# Patient Record
Sex: FEMALE | Race: WHITE | NOT HISPANIC OR LATINO | URBAN - METROPOLITAN AREA
[De-identification: names, ages, dates, MRNs, and addresses within clinical notes are randomized per-mention and may not be internally consistent; named-entity substitution may affect disease eponyms.]

---

## 2018-08-08 ENCOUNTER — OFFICE VISIT (OUTPATIENT)
Dept: URBAN - METROPOLITAN AREA CLINIC 45 | Facility: CLINIC | Age: 83
End: 2018-08-08
Payer: MEDICARE

## 2018-08-08 DIAGNOSIS — H04.123 DRY EYE SYNDROME OF BILATERAL LACRIMAL GLANDS: ICD-10-CM

## 2018-08-08 PROCEDURE — 99213 OFFICE O/P EST LOW 20 MIN: CPT | Performed by: OPTOMETRIST

## 2018-08-08 RX ORDER — TRAVOPROST 0.04 MG/ML
0.004 % SOLUTION/ DROPS OPHTHALMIC
Qty: 1 | Refills: 6 | Status: INACTIVE
Start: 2018-08-08 | End: 2018-09-06

## 2018-08-08 ASSESSMENT — KERATOMETRY
OS: 44.75
OD: 44.25

## 2018-08-08 ASSESSMENT — INTRAOCULAR PRESSURE
OD: 14
OS: 16

## 2018-08-08 NOTE — IMPRESSION/PLAN
Impression: Primary open-angle glaucoma, right eye, moderate stage: H40.1112. Plan: Pt getting irritation with the Latanoprost, pt to d/c. Pt to start TravatanZ 1 gtt qhs ou. Educated patient on risks of non-compliance, side effects, benefits and anticipated results, pt understands. Continue with Alphagan bid ou and Timolol bid ou.

## 2018-08-29 ENCOUNTER — OFFICE VISIT (OUTPATIENT)
Dept: URBAN - METROPOLITAN AREA CLINIC 45 | Facility: CLINIC | Age: 83
End: 2018-08-29
Payer: MEDICARE

## 2018-08-29 DIAGNOSIS — H40.1112 PRIMARY OPEN-ANGLE GLAUCOMA, RIGHT EYE, MODERATE STAGE: ICD-10-CM

## 2018-08-29 PROCEDURE — 99213 OFFICE O/P EST LOW 20 MIN: CPT | Performed by: OPTOMETRIST

## 2018-08-29 ASSESSMENT — INTRAOCULAR PRESSURE
OS: 15
OD: 16

## 2018-08-29 NOTE — IMPRESSION/PLAN
Impression: Primary open-angle glaucoma, left eye, mild stage: H40.1121. Plan: Travatan z q hs OU, timolol bid ou, alphagan bid ou. Pt having dryness and irritation from all the glc gtt.  Refer for SLT consult

## 2018-10-04 ENCOUNTER — OFFICE VISIT (OUTPATIENT)
Dept: URBAN - METROPOLITAN AREA CLINIC 45 | Facility: CLINIC | Age: 83
End: 2018-10-04
Payer: MEDICARE

## 2018-10-04 PROCEDURE — 99214 OFFICE O/P EST MOD 30 MIN: CPT | Performed by: OPHTHALMOLOGY

## 2018-10-04 PROCEDURE — 92020 GONIOSCOPY: CPT | Performed by: OPHTHALMOLOGY

## 2018-10-04 PROCEDURE — 92133 CPTRZD OPH DX IMG PST SGM ON: CPT | Performed by: OPHTHALMOLOGY

## 2018-10-04 ASSESSMENT — INTRAOCULAR PRESSURE
OD: 13
OS: 14

## 2018-10-04 NOTE — IMPRESSION/PLAN
Impression: Primary open-angle glaucoma, right eye, moderate stage: K46.9778.
10/18 OCT 70/77 9/8 Plan: Intraocular pressure well controlled, side effects from medication(s) account for the patient's complaints. Will continue Timolol BID OU, Alphagan BID OU. Pt feels irritation was less with Latanoprost. Will d/c Travatan, and restart Latanoprost QHS OU. If symptoms do not decrease or IOP elevates will proceed with SLT. R/B/A discussed with patient. Pt voiced understanding. Will change medications. New prescription sent to pharmacy today. IOP check with Dr Will Joel in 3 months.

## 2018-10-04 NOTE — IMPRESSION/PLAN
Impression: Blepharitis of right eyelid: H01.003. Plan: Blepharitis accounts for the patient's complaints. The condition appears chronic and eyelid scrubs with ointment have been suggested. The patient understands this may not cure the condition and that there may be the need to be on a maintenance program. Rec warm compresses.

## 2018-12-05 ENCOUNTER — OFFICE VISIT (OUTPATIENT)
Dept: URBAN - METROPOLITAN AREA CLINIC 45 | Facility: CLINIC | Age: 83
End: 2018-12-05
Payer: MEDICARE

## 2018-12-05 PROCEDURE — 92012 INTRM OPH EXAM EST PATIENT: CPT | Performed by: OPTOMETRIST

## 2018-12-05 RX ORDER — LATANOPROST 50 UG/ML
0.005 % SOLUTION OPHTHALMIC
Qty: 1 | Refills: 6 | Status: INACTIVE
Start: 2018-12-05 | End: 2019-03-14

## 2018-12-05 RX ORDER — BRIMONIDINE TARTRATE 1.5 MG/ML
0.15 % SOLUTION/ DROPS OPHTHALMIC
Qty: 1 | Refills: 6 | Status: INACTIVE
Start: 2018-12-05 | End: 2019-01-30

## 2018-12-05 RX ORDER — TIMOLOL MALEATE 5 MG/ML
0.5 % SOLUTION/ DROPS OPHTHALMIC
Qty: 1 | Refills: 7 | Status: INACTIVE
Start: 2018-12-05 | End: 2019-07-10

## 2018-12-05 ASSESSMENT — INTRAOCULAR PRESSURE
OD: 14
OS: 14

## 2018-12-05 NOTE — IMPRESSION/PLAN
Impression: Primary open-angle glaucoma, left eye, mild stage: H40.1121.  Plan: ALPHAGAN BID OU, TIMOLOL BID OU, LATANOPROST Q HS OU

## 2018-12-05 NOTE — IMPRESSION/PLAN
Impression: Blepharitis of left eyelid: H01.006. Plan: Lid scrubs and hygiene were explained. Patient instructed to apply warm compresses. Patient instructed to use artificial tears as needed.

## 2019-03-06 ENCOUNTER — OFFICE VISIT (OUTPATIENT)
Dept: URBAN - METROPOLITAN AREA CLINIC 45 | Facility: CLINIC | Age: 84
End: 2019-03-06
Payer: MEDICARE

## 2019-03-06 PROCEDURE — 92014 COMPRE OPH EXAM EST PT 1/>: CPT | Performed by: OPTOMETRIST

## 2019-03-06 PROCEDURE — 92250 FUNDUS PHOTOGRAPHY W/I&R: CPT | Performed by: OPTOMETRIST

## 2019-03-06 ASSESSMENT — INTRAOCULAR PRESSURE
OD: 14
OS: 14

## 2019-03-06 NOTE — IMPRESSION/PLAN
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  Plan: Daniel Delaney

## 2019-03-06 NOTE — IMPRESSION/PLAN
Impression: Primary open-angle glaucoma, bilateral, moderate stage: H20.0926.  Plan: ALPHAGAN BID OU, TIMOLOL BID OU, LATANOPROST Q HS OU

## 2019-07-10 ENCOUNTER — OFFICE VISIT (OUTPATIENT)
Dept: URBAN - METROPOLITAN AREA CLINIC 45 | Facility: CLINIC | Age: 84
End: 2019-07-10
Payer: MEDICARE

## 2019-07-10 DIAGNOSIS — H02.055 TRICHIASIS WITHOUT ENTROPION LEFT LOWER EYELID: ICD-10-CM

## 2019-07-10 DIAGNOSIS — H02.052 TRICHIASIS WITHOUT ENTROPIAN RIGHT LOWER EYELID: ICD-10-CM

## 2019-07-10 DIAGNOSIS — H01.8 OTHER SPECIFIED INFLAMMATION OF EYELID: ICD-10-CM

## 2019-07-10 PROCEDURE — 99213 OFFICE O/P EST LOW 20 MIN: CPT | Performed by: OPTOMETRIST

## 2019-07-10 PROCEDURE — 67820 REVISE EYELASHES: CPT | Performed by: OPTOMETRIST

## 2019-07-10 RX ORDER — NETARSUDIL AND LATANOPROST OPHTHALMIC SOLUTION, 0.02%/0.005% .2; .05 MG/ML; MG/ML
SOLUTION/ DROPS OPHTHALMIC; TOPICAL
Qty: 1 | Refills: 6 | Status: INACTIVE
Start: 2019-07-10 | End: 2019-08-01

## 2019-07-10 ASSESSMENT — INTRAOCULAR PRESSURE
OD: 14
OS: 15

## 2019-07-10 NOTE — IMPRESSION/PLAN
Impression: Primary open-angle glaucoma, bilateral, moderate stage: H21.0703. Plan: pt overwhelmed with 3 glc meds, dc all 3 and switch to Rocklatan. Fu in 3 weeks.  If not covered can do Combigan and Latanoprost.

## 2019-08-01 ENCOUNTER — OFFICE VISIT (OUTPATIENT)
Dept: URBAN - METROPOLITAN AREA CLINIC 45 | Facility: CLINIC | Age: 84
End: 2019-08-01
Payer: MEDICARE

## 2019-08-01 PROCEDURE — 99213 OFFICE O/P EST LOW 20 MIN: CPT | Performed by: OPTOMETRIST

## 2019-08-01 RX ORDER — BRIMONIDINE TARTRATE, TIMOLOL MALEATE 2; 5 MG/ML; MG/ML
SOLUTION/ DROPS OPHTHALMIC
Qty: 15 | Refills: 6 | Status: INACTIVE
Start: 2019-08-01 | End: 2019-08-20

## 2019-08-01 RX ORDER — LATANOPROST 50 UG/ML
0.005 % SOLUTION OPHTHALMIC
Qty: 7.5 | Refills: 6 | Status: INACTIVE
Start: 2019-08-01 | End: 2019-08-26

## 2019-08-01 ASSESSMENT — INTRAOCULAR PRESSURE
OS: 14
OD: 16

## 2019-08-01 NOTE — IMPRESSION/PLAN
Impression: Primary open-angle glaucoma, bilateral, moderate stage: O72.8577.  Plan: PT EXPERIENCING LIGHT-HEADEDNESS WITH ROCKLATAN, SWITCH TO COMBIGAN AND LATANOPROST

## 2019-08-22 ENCOUNTER — OFFICE VISIT (OUTPATIENT)
Dept: URBAN - METROPOLITAN AREA CLINIC 45 | Facility: CLINIC | Age: 84
End: 2019-08-22
Payer: MEDICARE

## 2019-08-22 PROCEDURE — 99213 OFFICE O/P EST LOW 20 MIN: CPT | Performed by: OPTOMETRIST

## 2019-08-22 RX ORDER — NEOMYCIN SULFATE, POLYMYXIN B SULFATE AND DEXAMETHASONE 3.5; 10000; 1 MG/G; [USP'U]/G; MG/G
OINTMENT OPHTHALMIC
Qty: 1 | Refills: 0 | Status: INACTIVE
Start: 2019-08-22 | End: 2019-08-26

## 2019-08-22 ASSESSMENT — INTRAOCULAR PRESSURE
OD: 20
OS: 16

## 2019-08-22 NOTE — IMPRESSION/PLAN
Impression: Other specified inflammation of eyelid: H01.8. Plan: maxitrol sagrario qid oS Educated patient on risks of non-compliance, side effects, benefits and anticipated results, pt understands.

## 2019-08-22 NOTE — IMPRESSION/PLAN
Impression: Primary open-angle glaucoma, bilateral, moderate stage: W70.1781. Plan: IOP is high today, pt did not take combigan due to it causing eye pain.  pt to continue with Latanoprost qhs ou and start [employer] 1 gtt bid ou

## 2019-08-26 ENCOUNTER — OFFICE VISIT (OUTPATIENT)
Dept: URBAN - METROPOLITAN AREA CLINIC 45 | Facility: CLINIC | Age: 84
End: 2019-08-26
Payer: MEDICARE

## 2019-08-26 PROCEDURE — 99213 OFFICE O/P EST LOW 20 MIN: CPT | Performed by: OPTOMETRIST

## 2019-08-26 RX ORDER — TIMOLOL MALEATE 5 MG/ML
0.5 % SOLUTION/ DROPS OPHTHALMIC
Qty: 1 | Refills: 7 | Status: INACTIVE
Start: 2019-08-26 | End: 2019-12-11

## 2019-08-26 RX ORDER — LATANOPROST 50 UG/ML
0.005 % SOLUTION OPHTHALMIC
Qty: 7.5 | Refills: 6 | Status: INACTIVE
Start: 2019-08-26 | End: 2019-12-11

## 2019-08-26 RX ORDER — BRIMONIDINE TARTRATE 1.5 MG/ML
0.15 % SOLUTION/ DROPS OPHTHALMIC
Qty: 1 | Refills: 6 | Status: INACTIVE
Start: 2019-08-26 | End: 2019-12-11

## 2019-08-26 ASSESSMENT — INTRAOCULAR PRESSURE
OS: 14
OD: 16

## 2019-08-26 NOTE — IMPRESSION/PLAN
Impression: Primary open-angle glaucoma, bilateral, moderate stage: U84.9708. Plan: pt to stop Simbrinza, pt is having irritation.  Pt to continue with Latanoprost qhs ou and Alphagan bid ou and timolol bid ou

## 2019-08-26 NOTE — IMPRESSION/PLAN
Impression: Other specified inflammation of eyelid: H01.8. Plan: pt to d/c maxitrol sagrario, due to side effects.  warm compresses bid ou

## 2019-12-11 ENCOUNTER — OFFICE VISIT (OUTPATIENT)
Dept: URBAN - METROPOLITAN AREA CLINIC 45 | Facility: CLINIC | Age: 84
End: 2019-12-11
Payer: MEDICARE

## 2019-12-11 PROCEDURE — 99213 OFFICE O/P EST LOW 20 MIN: CPT | Performed by: OPTOMETRIST

## 2019-12-11 RX ORDER — LATANOPROST 50 UG/ML
0.005 % SOLUTION OPHTHALMIC
Qty: 7.5 | Refills: 6 | Status: INACTIVE
Start: 2019-12-11 | End: 2020-04-22

## 2019-12-11 RX ORDER — LOTEPREDNOL ETABONATE 5 MG/G
0.5 % OINTMENT OPHTHALMIC
Qty: 1 | Refills: 0 | Status: INACTIVE
Start: 2019-12-11 | End: 2020-04-22

## 2019-12-11 RX ORDER — TIMOLOL MALEATE 5 MG/ML
0.5 % SOLUTION/ DROPS OPHTHALMIC
Qty: 1 | Refills: 7 | Status: INACTIVE
Start: 2019-12-11 | End: 2020-04-22

## 2019-12-11 RX ORDER — BRIMONIDINE TARTRATE 1.5 MG/ML
0.15 % SOLUTION/ DROPS OPHTHALMIC
Qty: 1 | Refills: 6 | Status: INACTIVE
Start: 2019-12-11 | End: 2020-04-17

## 2019-12-11 ASSESSMENT — INTRAOCULAR PRESSURE
OS: 15
OD: 15

## 2019-12-11 NOTE — IMPRESSION/PLAN
Impression: Primary open-angle glaucoma, bilateral, moderate stage: W56.7684.  Plan: Continue Timolol BID OU and Latanoprost QHS OU, alphagan bid ou

## 2019-12-11 NOTE — IMPRESSION/PLAN
Impression: Other specified inflammation of eyelid: H01.8.  Plan: Prescribed Lotemax sagrario apply 1/4 inch ribbon in the outer canthus os

## 2020-04-22 ENCOUNTER — OFFICE VISIT (OUTPATIENT)
Dept: URBAN - METROPOLITAN AREA CLINIC 45 | Facility: CLINIC | Age: 85
End: 2020-04-22
Payer: MEDICARE

## 2020-04-22 DIAGNOSIS — H40.1132 PRIMARY OPEN-ANGLE GLAUCOMA, BILATERAL, MODERATE STAGE: ICD-10-CM

## 2020-04-22 PROCEDURE — 92083 EXTENDED VISUAL FIELD XM: CPT | Performed by: OPTOMETRIST

## 2020-04-22 PROCEDURE — 92014 COMPRE OPH EXAM EST PT 1/>: CPT | Performed by: OPTOMETRIST

## 2020-04-22 PROCEDURE — 92134 CPTRZ OPH DX IMG PST SGM RTA: CPT | Performed by: OPTOMETRIST

## 2020-04-22 RX ORDER — LATANOPROST 50 UG/ML
0.005 % SOLUTION OPHTHALMIC
Qty: 7.5 | Refills: 6 | Status: INACTIVE
Start: 2020-04-22 | End: 2021-07-06

## 2020-04-22 RX ORDER — TIMOLOL MALEATE 5 MG/ML
0.5 % SOLUTION/ DROPS OPHTHALMIC
Qty: 1 | Refills: 7 | Status: INACTIVE
Start: 2020-04-22 | End: 2022-04-05

## 2020-04-22 RX ORDER — BRIMONIDINE TARTRATE 2 MG/ML
0.2 % SOLUTION/ DROPS OPHTHALMIC
Qty: 15 | Refills: 6 | Status: INACTIVE
Start: 2020-04-22 | End: 2020-06-05

## 2020-04-22 ASSESSMENT — INTRAOCULAR PRESSURE
OD: 14
OS: 15

## 2020-04-22 NOTE — IMPRESSION/PLAN
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  Plan: Chase Felix

## 2020-04-22 NOTE — IMPRESSION/PLAN
Impression: Primary open-angle glaucoma, bilateral, moderate stage: X42.9537.  Plan: brimonidine bid ou, timolol bid ou, latanoprost q hs ou

## 2020-06-05 ENCOUNTER — OFFICE VISIT (OUTPATIENT)
Dept: URBAN - METROPOLITAN AREA CLINIC 45 | Facility: CLINIC | Age: 85
End: 2020-06-05
Payer: MEDICARE

## 2020-06-05 PROCEDURE — 92012 INTRM OPH EXAM EST PATIENT: CPT | Performed by: OPTOMETRIST

## 2020-06-05 RX ORDER — LISINOPRIL 20 MG/1
20 MG TABLET ORAL
Qty: 0 | Refills: 0 | Status: ACTIVE
Start: 2020-06-05

## 2020-06-05 RX ORDER — NEOMYCIN SULFATE, POLYMYXIN B SULFATE AND DEXAMETHASONE 3.5; 10000; 1 MG/G; [USP'U]/G; MG/G
OINTMENT OPHTHALMIC
Qty: 1 | Refills: 0 | Status: INACTIVE
Start: 2020-06-05 | End: 2020-06-09

## 2020-06-05 ASSESSMENT — INTRAOCULAR PRESSURE
OD: 12
OS: 15

## 2020-06-05 NOTE — IMPRESSION/PLAN
Impression: Primary open-angle glaucoma, right eye, moderate stage: H40.1112. Plan: pt is on max meds and having chronic blepharitis , discussed surgical options, consult with Dr. Analy Hou. Continue with Latanoprost q hs ou, Timolol bid ou. Dc Brimonidine for now.

## 2020-06-05 NOTE — IMPRESSION/PLAN
Impression: Other specified inflammation of eyelid: H01.8.  Plan: LOTEMAX sagrario apply 1/8 inch ribbon prn to [facility]

## 2020-06-11 ENCOUNTER — OFFICE VISIT (OUTPATIENT)
Dept: URBAN - METROPOLITAN AREA CLINIC 45 | Facility: CLINIC | Age: 85
End: 2020-06-11
Payer: MEDICARE

## 2020-06-11 PROCEDURE — 99213 OFFICE O/P EST LOW 20 MIN: CPT | Performed by: OPHTHALMOLOGY

## 2020-06-11 ASSESSMENT — INTRAOCULAR PRESSURE
OS: 17
OD: 14

## 2020-06-11 NOTE — IMPRESSION/PLAN
Impression: Primary open-angle glaucoma, right eye, moderate stage: D93.6411. 508/503, 4/20 OCT 70/73 9/7 Plan: Discussed diagnosis with patient. Patient having chronic blepharitis likely due to glaucoma medication. Recommend patient continue with Latanoprost QHS OU and Timolol BID OU. Recommend pt undergo SLT OS then OD to help lower IOP further. Goal to d/c Latanoprost after SLT. R/B discussed, pt understands and wishes to proceed.

## 2020-07-09 ENCOUNTER — OFFICE VISIT (OUTPATIENT)
Dept: URBAN - METROPOLITAN AREA CLINIC 45 | Facility: CLINIC | Age: 85
End: 2020-07-09
Payer: MEDICARE

## 2020-07-09 DIAGNOSIS — H01.006 BLEPHARITIS OF LEFT EYELID: ICD-10-CM

## 2020-07-09 DIAGNOSIS — H01.003 BLEPHARITIS OF RIGHT EYELID: Primary | ICD-10-CM

## 2020-07-09 PROCEDURE — 92012 INTRM OPH EXAM EST PATIENT: CPT | Performed by: OPTOMETRIST

## 2020-07-09 RX ORDER — BRIMONIDINE TARTRATE 1.5 MG/ML
0.15 % SOLUTION/ DROPS OPHTHALMIC
Qty: 1 | Refills: 6 | Status: INACTIVE
Start: 2020-07-09 | End: 2021-01-04

## 2020-07-09 ASSESSMENT — INTRAOCULAR PRESSURE
OD: 18
OS: 18

## 2020-07-09 NOTE — IMPRESSION/PLAN
Impression: Primary open-angle glaucoma, bilateral, moderate stage: M93.5142. Plan: continue Timolol bid ou, Latanoprost q hs ou. Add Alphagan P 0.15% bid (pt tried and failed Brimonidine).  Keep appt with Dr. Ann Rogers

## 2020-08-11 ENCOUNTER — PROCEDURE (OUTPATIENT)
Dept: URBAN - METROPOLITAN AREA CLINIC 45 | Facility: CLINIC | Age: 85
End: 2020-08-11
Payer: MEDICARE

## 2020-08-11 PROCEDURE — 65855 TRABECULOPLASTY LASER SURG: CPT | Performed by: OPHTHALMOLOGY

## 2020-08-19 ENCOUNTER — POST-OPERATIVE VISIT (OUTPATIENT)
Dept: URBAN - METROPOLITAN AREA CLINIC 45 | Facility: CLINIC | Age: 85
End: 2020-08-19

## 2020-08-19 DIAGNOSIS — Z48.810 ENCNTR FOR SURGICAL AFTCR FOL SURGERY ON THE SENSE ORGANS: ICD-10-CM

## 2020-08-19 DIAGNOSIS — Z09 ENCNTR FOR F/U EXAM AFT TRTMT FOR COND OTH THAN MALIG NEOPLM: Primary | ICD-10-CM

## 2020-08-19 PROCEDURE — 99024 POSTOP FOLLOW-UP VISIT: CPT | Performed by: OPTOMETRIST

## 2020-08-19 ASSESSMENT — INTRAOCULAR PRESSURE
OD: 10
OS: 10

## 2021-02-15 ENCOUNTER — OFFICE VISIT (OUTPATIENT)
Dept: URBAN - METROPOLITAN AREA CLINIC 45 | Facility: CLINIC | Age: 86
End: 2021-02-15
Payer: MEDICARE

## 2021-02-15 PROCEDURE — 99214 OFFICE O/P EST MOD 30 MIN: CPT | Performed by: OPTOMETRIST

## 2021-02-15 PROCEDURE — 92134 CPTRZ OPH DX IMG PST SGM RTA: CPT | Performed by: OPTOMETRIST

## 2021-02-15 ASSESSMENT — INTRAOCULAR PRESSURE
OD: 16
OS: 14

## 2021-02-15 NOTE — IMPRESSION/PLAN
Impression: Exudative age-related macular degeneration of right eye: H35.0530.  Plan: return for retina consult

oct is irregular ou, no SRF on oct but heme on exam

## 2021-02-17 ENCOUNTER — OFFICE VISIT (OUTPATIENT)
Dept: URBAN - METROPOLITAN AREA CLINIC 45 | Facility: CLINIC | Age: 86
End: 2021-02-17
Payer: MEDICARE

## 2021-02-17 PROCEDURE — 92134 CPTRZ OPH DX IMG PST SGM RTA: CPT | Performed by: OPHTHALMOLOGY

## 2021-02-17 PROCEDURE — 67028 INJECTION EYE DRUG: CPT | Performed by: OPHTHALMOLOGY

## 2021-02-17 PROCEDURE — 92235 FLUORESCEIN ANGRPH MLTIFRAME: CPT | Performed by: OPHTHALMOLOGY

## 2021-02-17 PROCEDURE — 92014 COMPRE OPH EXAM EST PT 1/>: CPT | Performed by: OPHTHALMOLOGY

## 2021-02-17 ASSESSMENT — INTRAOCULAR PRESSURE
OS: 21
OD: 17

## 2021-02-17 NOTE — IMPRESSION/PLAN
Impression: Nonexudative age-related macular degeneration of lt eye, intermediate dry stage: H35.3122. Plan:   AREDS2/jude   Recheck 6m OCT/exam

 *25 mod for exam of fellow eye dry AMD OS apart from injection OD

## 2021-02-17 NOTE — IMPRESSION/PLAN
Impression: Exudative age-related macular degeneration of right eye: H35.1110. Plan:  JOHNSON OD today  4w OCT/JOHNSON OD

## 2021-02-24 ENCOUNTER — OFFICE VISIT (OUTPATIENT)
Dept: URBAN - METROPOLITAN AREA CLINIC 45 | Facility: CLINIC | Age: 86
End: 2021-02-24
Payer: MEDICARE

## 2021-02-24 DIAGNOSIS — H35.3210 EXUDATIVE AGE-RELATED MACULAR DEGENERATION OF RIGHT EYE: ICD-10-CM

## 2021-02-24 DIAGNOSIS — H40.1121 PRIMARY OPEN-ANGLE GLAUCOMA, LEFT EYE, MILD STAGE: ICD-10-CM

## 2021-02-24 PROCEDURE — 92133 CPTRZD OPH DX IMG PST SGM ON: CPT | Performed by: OPTOMETRIST

## 2021-02-24 PROCEDURE — 99213 OFFICE O/P EST LOW 20 MIN: CPT | Performed by: OPTOMETRIST

## 2021-02-24 ASSESSMENT — INTRAOCULAR PRESSURE
OS: 13
OD: 11

## 2021-02-24 NOTE — IMPRESSION/PLAN
Impression: Exudative age-related macular degeneration of right eye: H35.5300.  Plan: Keep appointment with Retinal specialist.

## 2021-02-24 NOTE — IMPRESSION/PLAN
Impression: Primary open-angle glaucoma, right eye, moderate stage: H40.1112. Plan: OCT show Moderate thinning OD, Normal OS, Stable, Reliable. IOP is better today OU. Continue with Alphagan and Timolol BID OU, Latanoprost QHS OU. Rtc for iop check and vf 24-2.

## 2021-02-24 NOTE — IMPRESSION/PLAN
Impression: Nonexudative age-related macular degeneration of lt eye, intermediate dry stage: H35.3122. Plan: VIJAY WELLER.

## 2021-03-18 ENCOUNTER — OFFICE VISIT (OUTPATIENT)
Dept: URBAN - METROPOLITAN AREA CLINIC 54 | Facility: CLINIC | Age: 86
End: 2021-03-18
Payer: MEDICARE

## 2021-03-18 PROCEDURE — 99204 OFFICE O/P NEW MOD 45 MIN: CPT | Performed by: OPHTHALMOLOGY

## 2021-03-18 PROCEDURE — 92134 CPTRZ OPH DX IMG PST SGM RTA: CPT | Performed by: OPHTHALMOLOGY

## 2021-03-18 ASSESSMENT — INTRAOCULAR PRESSURE
OD: 16
OS: 16

## 2021-03-18 NOTE — IMPRESSION/PLAN
Impression: Exdtve age-rel mclr degn, right eye, with actv chrdl neovas: H35.3211. Right.
s/p Avastin injection x 2/17/21 OCT:
OD: atrophy, drusen OS: drusen Plan: The diagnosis, natural history, and prognosis of wet AMD, as well as the risks and benefits of various treatment options including laser, PDT, Avastin, Lucentis and Eylea; along with the alternatives of observation or participation in a clinical trial, were discussed at length. The patient understands that treatment may not improve vision, but should reduce the risk of further visual loss. The patient understands that smoking is the most significant modifiable risk factor for the development of advanced AMD, and also understands that if they do smoke (or have history of smoking in the past 5 years), they cannot take vitamin A/beta-carotene, since it may increase their risk for lung cancer. Given stability of vision and exam, pt elects to proceed with observation with PRN tx. 

RTC 1 month DFE OU OCT OU Re-eval Avastin

## 2021-03-18 NOTE — IMPRESSION/PLAN
Impression: Nexdtve age-related mclr degn, left eye, intermed dry stage: H35.3122. Plan: Examination and OCT confirm the presence of RPE changes and drusen consistent with dry macular degeneration. The diagnosis, natural history, and prognosis of dry AMD as well as the current lack of treatment were discussed at length. The patient was instructed to begin taking AREDS 2 protocol anti-oxidant vitamins. The use of an Amsler grid and the importance of weekly self-monitoring were reviewed. The patient understands that smoking is the most significant modifiable risk factor for the development of advanced AMD, and also understands that if they do smoke (or have history of smoking in the past 5 years), they cannot take vitamin A/beta-carotene, since it may increase their risk for lung cancer. The patient was instructed to call our office immediately upon any decreased vision or increased symptoms.

## 2021-03-18 NOTE — IMPRESSION/PLAN
Impression: Vitreous degeneration, bilateral: H43.813. Plan: Indirect ophthalmoscopy with scleral depression was performed and no retinal breaks or evidence of detachment were identified. The diagnosis, natural history, and prognosis of PVD were discussed at length. The signs and symptoms of retinal break/detachment including increased flashes, new-onset floaters, and development of a shadow/curtain shade in the visual field were reviewed.

## 2021-04-15 ENCOUNTER — OFFICE VISIT (OUTPATIENT)
Dept: URBAN - METROPOLITAN AREA CLINIC 54 | Facility: CLINIC | Age: 86
End: 2021-04-15
Payer: MEDICARE

## 2021-04-15 DIAGNOSIS — H35.3211 EXDTVE AGE-REL MCLR DEGN, RIGHT EYE, WITH ACTV CHRDL NEOVAS: Primary | ICD-10-CM

## 2021-04-15 PROCEDURE — 92134 CPTRZ OPH DX IMG PST SGM RTA: CPT | Performed by: OPHTHALMOLOGY

## 2021-04-15 PROCEDURE — 99214 OFFICE O/P EST MOD 30 MIN: CPT | Performed by: OPHTHALMOLOGY

## 2021-04-15 ASSESSMENT — INTRAOCULAR PRESSURE
OS: 14
OD: 14

## 2021-05-27 ENCOUNTER — OFFICE VISIT (OUTPATIENT)
Dept: URBAN - METROPOLITAN AREA CLINIC 54 | Facility: CLINIC | Age: 86
End: 2021-05-27
Payer: MEDICARE

## 2021-05-27 DIAGNOSIS — H40.9 GLAUCOMA: ICD-10-CM

## 2021-05-27 DIAGNOSIS — H43.813 VITREOUS DEGENERATION, BILATERAL: ICD-10-CM

## 2021-05-27 DIAGNOSIS — H35.3122 NEXDTVE AGE-RELATED MCLR DEGN, LEFT EYE, INTERMED DRY STAGE: ICD-10-CM

## 2021-05-27 PROCEDURE — 92134 CPTRZ OPH DX IMG PST SGM RTA: CPT | Performed by: OPHTHALMOLOGY

## 2021-05-27 PROCEDURE — 99214 OFFICE O/P EST MOD 30 MIN: CPT | Performed by: OPHTHALMOLOGY

## 2021-05-27 ASSESSMENT — INTRAOCULAR PRESSURE
OS: 14
OD: 13

## 2021-05-27 NOTE — IMPRESSION/PLAN
Impression: Exdtve age-rel mclr degn, right eye, with actv chrdl neovas: H35.3211. Right.
s/p Avastin injection x 2/17/21 OCT:
OD: atrophy, drusen OS: drusen Plan: The diagnosis, natural history, and prognosis of wet AMD, as well as the risks and benefits of various treatment options including laser, PDT, Avastin, Lucentis and Eylea; along with the alternatives of observation or participation in a clinical trial, were discussed at length. The patient understands that treatment may not improve vision, but should reduce the risk of further visual loss. The patient understands that smoking is the most significant modifiable risk factor for the development of advanced AMD, and also understands that if they do smoke (or have history of smoking in the past 5 years), they cannot take vitamin A/beta-carotene, since it may increase their risk for lung cancer. Given stability of vision and exam, pt elects to proceed with observation with PRN tx. 

RTC 3 month DFE OU OCT OU Re-eval Avastin

## 2021-07-02 ENCOUNTER — OFFICE VISIT (OUTPATIENT)
Dept: URBAN - METROPOLITAN AREA CLINIC 45 | Facility: CLINIC | Age: 86
End: 2021-07-02
Payer: MEDICARE

## 2021-07-02 DIAGNOSIS — H35.3131 NONEXUDATIVE AGE-RELATED MACULAR DEGENERATION, BILATERAL, EARLY DRY STAGE: ICD-10-CM

## 2021-07-02 PROCEDURE — 99213 OFFICE O/P EST LOW 20 MIN: CPT | Performed by: OPTOMETRIST

## 2021-07-02 PROCEDURE — 92083 EXTENDED VISUAL FIELD XM: CPT | Performed by: OPTOMETRIST

## 2021-07-02 RX ORDER — LOTEPREDNOL ETABONATE 5 MG/G
0.5 % OINTMENT OPHTHALMIC
Qty: 1 | Refills: 0 | Status: ACTIVE
Start: 2021-07-02

## 2021-07-02 ASSESSMENT — INTRAOCULAR PRESSURE
OS: 16
OD: 17
OD: 13
OS: 14

## 2021-07-02 ASSESSMENT — KERATOMETRY
OD: 44.38
OS: 44.75

## 2021-07-02 NOTE — IMPRESSION/PLAN
Impression: Exudative age-related macular degeneration of right eye: H35.8260. Plan: S/P Anti-VEGF OD. Patient is moving to [place], recommend patient see ophthalmology soon after she is settled.

## 2021-07-02 NOTE — IMPRESSION/PLAN
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  Plan: See plan 1

## 2021-07-02 NOTE — IMPRESSION/PLAN
Impression: Primary open-angle glaucoma, right eye, moderate stage: H40.1112. Plan: IOP well-controlled with gtts. Continue Timolol BID OU, Alphagan BID OU, Latan QHS. VF today shows no glaucomatous defects.

## 2022-04-27 NOTE — IMPRESSION/PLAN
Impression: Primary open-angle glaucoma, right eye, moderate stage: H40.1112. Plan: pt on max med therapy, experiencing side effects.  Disscussed SLT, see Dr. Yoli Santos Per Dr. Tena, there was no drainage this morning.  Patient may see April or her tomorrow.    April said it is okay to schedule patient to see her 04/28/22, and I called the patient and scheduled.

## 2022-06-18 NOTE — IMPRESSION/PLAN
"Encounter Date: 6/17/2022    ED Physician Progress Notes        Physician Note:   ED Physician Hand-off Note:    ED Course: I assumed care of patient from off-going ED primary team. Briefly, "Abdoul Villalta is a 27 y.o. female with medical history of hidradenitis, crohn's disease on infliximab infusions, pyoderma gangrenosum, fibromyalgia, obesity presenting to the ED with the chief complaint of abdominal pain. "CBC with no leukocytosis or anemia.  CMP without significant electrolyte abnormalities.  No ASHELY. Normal LFTs.  CRP and ESR within normal limits.  Lipase normal.    At the time of signout, the plan was pending CT scan.    Medications given in the ED:    Medications  sodium chloride 0.9% bolus 1,000 mL (0 mLs Intravenous Stopped 6/17/22 2052)   HYDROmorphone injection 0.5 mg (0.5 mg Intravenous Given 6/17/22 1946)  ondansetron injection 4 mg (4 mg Intravenous Given 6/17/22 1945)  iohexoL (OMNIPAQUE 350) injection 100 mL (100 mLs Intravenous Given 6/17/22 2339)    ED course:  CT does not show any acute abnormality.  She has history of splenomegaly with improvement and decrease in size in the collection as seen on prior imaging test.  No new findings.    Patient updated with results.  She does not need any further labs or imaging at this time.  Doubt Crohn's flare.  She will follow up closely with her gastroenterologist.  All of her questions were answered.  Patient comfortable with plan and stable for discharge.    Disposition: discharge    Impression:   Crohn's disease without complication, unspecified gastrointestinal tract location  (primary encounter diagnosis)  Bloody stools  Abdominal pain, unspecified abdominal location        12:14 AM  Shelly Nava PA-C  Emergent Department  [facility]  Spectralink #[phone] or #[phone]       " Impression: Dry eye syndrome of bilateral lacrimal glands: H04.123. Plan: Patient instructed to use artificial tears as needed.